# Patient Record
Sex: MALE | Race: BLACK OR AFRICAN AMERICAN | NOT HISPANIC OR LATINO | URBAN - METROPOLITAN AREA
[De-identification: names, ages, dates, MRNs, and addresses within clinical notes are randomized per-mention and may not be internally consistent; named-entity substitution may affect disease eponyms.]

---

## 2019-01-17 ENCOUNTER — EMERGENCY (EMERGENCY)
Facility: HOSPITAL | Age: 39
LOS: 1 days | Discharge: HOME | End: 2019-01-17
Attending: EMERGENCY MEDICINE | Admitting: EMERGENCY MEDICINE
Payer: COMMERCIAL

## 2019-01-17 VITALS
SYSTOLIC BLOOD PRESSURE: 115 MMHG | TEMPERATURE: 99 F | RESPIRATION RATE: 20 BRPM | HEART RATE: 74 BPM | DIASTOLIC BLOOD PRESSURE: 79 MMHG | OXYGEN SATURATION: 100 %

## 2019-01-17 PROCEDURE — 29125 APPL SHORT ARM SPLINT STATIC: CPT | Mod: LT

## 2019-01-17 PROCEDURE — 73130 X-RAY EXAM OF HAND: CPT | Mod: 26,LT

## 2019-01-17 PROCEDURE — 99283 EMERGENCY DEPT VISIT LOW MDM: CPT | Mod: 25

## 2019-01-17 RX ORDER — IBUPROFEN 200 MG
800 TABLET ORAL ONCE
Qty: 0 | Refills: 0 | Status: COMPLETED | OUTPATIENT
Start: 2019-01-17 | End: 2019-01-17

## 2019-01-17 RX ADMIN — Medication 800 MILLIGRAM(S): at 19:18

## 2019-01-17 NOTE — ED PROVIDER NOTE - MEDICAL DECISION MAKING DETAILS
37 y/o M w/ L hand pain s/p hand injury. r/o 4th metacarpal fracture vs. contusion. Will obtain X-ray, give Tylenol and Motrin, and if no bony injury, will encourage to obtain a hand splint and rest for a week.

## 2019-01-17 NOTE — ED PROVIDER NOTE - OBJECTIVE STATEMENT
37 y/o M w/ no pertinent PMH, presents to ED c/o L 4th metacarpal hand pain x2days. Pt states he accidently struck his hand against a wooden door, and ever since, pain has been getting progressively worse. Pt works as an  and does a lot of  heavy lifting, using his L hand to lift heavy objects. Denies any drug use, fever, chills, or any other acute complaints. NKDA.

## 2019-01-17 NOTE — ED PROVIDER NOTE - UPPER EXTREMITY EXAM, LEFT
+Tender at 4th distal metacarpal near 5th MCP joint. Mild swelling at dorsum of hand. Wrist has FROM. Radial pulses intact.

## 2022-02-10 NOTE — ED PROVIDER NOTE - PROGRESS NOTE DETAILS
Is This A New Presentation, Or A Follow-Up?: Acne Irby: left 4th mid shaft spiral mildly displaced fx. volar splint placed. encourage to elevate and f/u with hand surgeon.  no heavy lifting, motrin for pain. splint care instructions. No